# Patient Record
Sex: MALE | NOT HISPANIC OR LATINO | Employment: FULL TIME | URBAN - METROPOLITAN AREA
[De-identification: names, ages, dates, MRNs, and addresses within clinical notes are randomized per-mention and may not be internally consistent; named-entity substitution may affect disease eponyms.]

---

## 2024-01-24 ENCOUNTER — HOSPITAL ENCOUNTER (EMERGENCY)
Facility: HOSPITAL | Age: 29
Discharge: HOME | End: 2024-01-24
Payer: COMMERCIAL

## 2024-01-24 ENCOUNTER — APPOINTMENT (OUTPATIENT)
Dept: RADIOLOGY | Facility: HOSPITAL | Age: 29
End: 2024-01-24
Payer: COMMERCIAL

## 2024-01-24 VITALS
DIASTOLIC BLOOD PRESSURE: 78 MMHG | WEIGHT: 235 LBS | BODY MASS INDEX: 28.62 KG/M2 | HEIGHT: 76 IN | SYSTOLIC BLOOD PRESSURE: 115 MMHG | OXYGEN SATURATION: 99 % | TEMPERATURE: 98.7 F | RESPIRATION RATE: 16 BRPM | HEART RATE: 69 BPM

## 2024-01-24 DIAGNOSIS — R51.9 ACUTE NONINTRACTABLE HEADACHE, UNSPECIFIED HEADACHE TYPE: Primary | ICD-10-CM

## 2024-01-24 PROCEDURE — 99284 EMERGENCY DEPT VISIT MOD MDM: CPT

## 2024-01-24 PROCEDURE — 2500000001 HC RX 250 WO HCPCS SELF ADMINISTERED DRUGS (ALT 637 FOR MEDICARE OP)

## 2024-01-24 PROCEDURE — 70450 CT HEAD/BRAIN W/O DYE: CPT | Performed by: STUDENT IN AN ORGANIZED HEALTH CARE EDUCATION/TRAINING PROGRAM

## 2024-01-24 PROCEDURE — 70450 CT HEAD/BRAIN W/O DYE: CPT

## 2024-01-24 RX ORDER — IBUPROFEN 600 MG/1
600 TABLET ORAL ONCE
Status: COMPLETED | OUTPATIENT
Start: 2024-01-24 | End: 2024-01-24

## 2024-01-24 RX ADMIN — IBUPROFEN 600 MG: 600 TABLET, FILM COATED ORAL at 18:40

## 2024-01-24 ASSESSMENT — COLUMBIA-SUICIDE SEVERITY RATING SCALE - C-SSRS
2. HAVE YOU ACTUALLY HAD ANY THOUGHTS OF KILLING YOURSELF?: NO
6. HAVE YOU EVER DONE ANYTHING, STARTED TO DO ANYTHING, OR PREPARED TO DO ANYTHING TO END YOUR LIFE?: NO
1. IN THE PAST MONTH, HAVE YOU WISHED YOU WERE DEAD OR WISHED YOU COULD GO TO SLEEP AND NOT WAKE UP?: NO

## 2024-01-24 ASSESSMENT — PAIN - FUNCTIONAL ASSESSMENT
PAIN_FUNCTIONAL_ASSESSMENT: 0-10
PAIN_FUNCTIONAL_ASSESSMENT: 0-10

## 2024-01-24 ASSESSMENT — PAIN DESCRIPTION - LOCATION: LOCATION: HEAD

## 2024-01-24 ASSESSMENT — PAIN DESCRIPTION - PAIN TYPE: TYPE: ACUTE PAIN

## 2024-01-24 ASSESSMENT — PAIN SCALES - GENERAL
PAINLEVEL_OUTOF10: 0 - NO PAIN
PAINLEVEL_OUTOF10: 1

## 2024-01-24 NOTE — ED TRIAGE NOTES
Pt c/o swelling in his temples today . Pt then developed a slight ha. No otc meds. No blurred vision.

## 2024-01-24 NOTE — ED PROVIDER NOTES
HPI   Chief Complaint   Patient presents with    Headache     Pt states swelling in temples       HPI  HISTORY OF PRESENT ILLNESS:  28 y.o. male who presents to the ED with complaint of a headache.  Patient states that earlier today he saw himself on his phone camera and states it looked like his temples were swollen.  He states that he then developed a headache at the bilateral temples and into the occipital region.  Aching in quality.  Headache was not thunderclap in onset and is not the worst headache of his life.  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  He called his PCP, who recommended he come to the ED for evaluation.  He states that he now has a very mild 1/10 aching headache at the occipital region, and otherwise feels well.  He states that he feels like the swelling has resolved.  He denies neck pain or stiffness.  No fevers or chills. No syncope, no confusion. No nausea or vomiting. Denies weakness, numbness, or tingling of extremities.  No dizziness or lightheadedness.  No difficulty with gait, speech, or swallowing.  No blurry vision or vision loss. No drug or alcohol use. No use of anticoagulants or antiplatelets. No history of bleeding or clotting disorders.  He states that he does not normally get headaches, and has never had similar symptoms in the past.  He does have a history of HIV, and states that he is currently undetectable on his medications.  Would like a CT scan.  Denies chest pain or shortness of breath, no abdominal pain, no cough, no recent illness.  No other complaints or symptoms voiced.    PMH: HIV, HTN, PFO, Bipolar disorder, PTSD  Family history: noncontributory  Social history: non smoker, no ETOH, no illicit substances    REVIEW OF SYSTEMS:  General: Denies fever, chills, malaise, confusion, decreased appetite or fluids  Respiratory:  Denies cough, shortness of breath  Cardiac:  Denies chest pain,  palpitations  Gastrointestinal:  Denies abdominal pain, nausea, vomiting, diarrhea  Musculoskeletal:  Denies joint pain, neck pain, back pain, swelling, weakness  Neurological: +headache. Denies dizziness, confusion, headache, numbness, tingling, syncope  Eyes:  Denies blurry vision, vision loss or changes  ENMT:  Denies nosebleed, sore throat  Skin: Denies rash or wounds. Denies bruising or bleeding  Genitourinary:  Denies dysuria, flank pain, hematuria        No data recorded                Patient History   No past medical history on file.  No past surgical history on file.  No family history on file.  Social History     Tobacco Use    Smoking status: Not on file    Smokeless tobacco: Not on file   Substance Use Topics    Alcohol use: Not on file    Drug use: Not on file       Physical Exam   ED Triage Vitals [01/24/24 1804]   Temperature Heart Rate Respirations BP   37.1 °C (98.7 °F) 63 15 150/69      Pulse Ox Temp src Heart Rate Source Patient Position   98 % -- -- --      BP Location FiO2 (%)     -- --       Physical Exam  General: Vital signs stable. Alert & oriented.  No acute distress. Well nourished. Well hydrated. GCS=15  Neuro: Cranial nerves grossly intact. Normal gait. No motor or sensory changes. Appropriate and equal sensation and strength bilaterally.  No focal findings identified.  HENMT: No appreciable edema about the temples, face, or skull.  Nontender over the temples.  No hematoma, abrasion, or ecchymosis. No Raccoon eyes, No Holly sign. Facial bones and skull nontender, no step-offs or crepitus. Mucous membranes moist. Hearing grossly intact. No meningeal signs, moves neck freely. No C-spine tenderness, normal head and neck range of motion.  Eyes: PERRL, EOMs intact and nonpainful. No nystagmus. Visual fields intact bilaterally. Conjunctiva clear with no redness.  Cardiac: Sinus rhythm.  Pulmonary: No respiratory distress, normal respiratory effort. No accessory muscle use. CTAB.  Abdominal:  Soft and nontender.  Skin: Intact, warm, and dry. No lesions, rash, petechiae, or purpura.  MSK: full range of motion of upper and lower extremities. No tenderness over the midline cervical, thoracic, or lumbar spine. Extremities are warm and well perfused, no cyanosis, no edema.  Psych: Appropriate mood and affect.  ED Course & MDM   Diagnoses as of 01/25/24 0848   Acute nonintractable headache, unspecified headache type       Medical Decision Making  ED course / MDM     Summary:  Patient presented with a headache and feel like his temples were swollen.  Symptoms have improved, no longer feels like his head is swollen.  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  Vital signs are stable, mildly hypertensive in triage at 150/69, patient is very well-appearing, ambulates unassisted, no acute distress.  On exam, there are no neurologic deficits, normal strength and sensation of upper and lower extremities, no ataxia.  No visible edema over the face, temples, or head.  No meningeal signs.  Nontender over the temples, scalp, or face.  Patient does have a history of HIV, and does not get headaches often, would like a CT scan, reasonable given his risk factors.  He was given a dose of Motrin in the ED. CT scan unremarkable, no acute intracranial hemorrhage, mass effect, or CT.  No acute infarct.  Repeat vitals show BP improved to 115/78.  Patient feels reassured with a normal CT scan.  He had no neck pain or stiffness, no fevers or chills, headache was not thunderclap in onset, and is not the worst headache of his life, low suspicion for meningitis or SAH, patient agrees that LP is not indicated at this time.  He is eager for discharge.  Will follow-up with his PCP this week. Patient was given strict return precautions, understands reasons to return to the ED. Also discussed supportive care instructions. I expressed the  importance of outpatient follow up with their PCP. All questions were answered, patient expressed understanding and stated that they would comply.    Patient was advised to follow up with PCP or recommended provider in 2-3 days for another evaluation and exam. I advised patient and family/friend/caregiver/guardian to return or go to closest emergency room immediately if symptoms change, get worse, new symptoms develop prior to follow up. If there is no improvement in symptoms in the next 24 hours they are advised to return for further evaluation and exam. I also explained the plan and treatment course. Patient and family/friend/caregiver/guardian is in agreement with plan, treatment course, and follow up and states verbally that they will comply.    Impression:  1. See diagnosis    Plan: Homegoing. I discussed the differential, results, and discharge plan with the patient and family/friend/caregiver. I emphasized the importance of follow-up with the physician I referred them to in the timeframe recommended.  I explained reasons for the patient to return to the Emergency Department. They agreed that if they feel their condition is worsening or if they have any other concern they should call 911 immediately for further assistance. We also discussed medications that were prescribed including common side effects and interactions. The patient was advised to abstain from driving, operating heavy machinery, or making significant decisions while taking medications such as opiates and muscle relaxers that may impair this. I gave the patient an opportunity to ask all questions they had and answered all of them accordingly. They understand return precautions and discharge instructions. The patient and family/friend/caregiver expressed understanding verbally and that they would comply.       Disposition: Discharge    This note has been transcribed using voice recognition and may contain grammatical errors, misplaced words,  incorrect words, incorrect phrases or other errors.   Procedure  Procedures     Aubree Faye PA-C  01/25/24 0809

## 2024-01-24 NOTE — Clinical Note
Pete Mcconnell was seen and treated in our emergency department on 1/24/2024.  He may return to work on 01/25/2024.       If you have any questions or concerns, please don't hesitate to call.      Aubree Faye PA-C

## 2025-01-31 ENCOUNTER — OFFICE VISIT (OUTPATIENT)
Dept: URGENT CARE | Age: 30
End: 2025-01-31
Payer: COMMERCIAL

## 2025-01-31 VITALS
DIASTOLIC BLOOD PRESSURE: 66 MMHG | RESPIRATION RATE: 20 BRPM | TEMPERATURE: 99.2 F | HEIGHT: 76 IN | WEIGHT: 250 LBS | OXYGEN SATURATION: 94 % | BODY MASS INDEX: 30.44 KG/M2 | SYSTOLIC BLOOD PRESSURE: 119 MMHG | HEART RATE: 119 BPM

## 2025-01-31 DIAGNOSIS — J10.1 INFLUENZA A: Primary | ICD-10-CM

## 2025-01-31 DIAGNOSIS — R05.1 ACUTE COUGH: ICD-10-CM

## 2025-01-31 LAB
POC RAPID INFLUENZA A: POSITIVE
POC RAPID INFLUENZA B: NEGATIVE

## 2025-01-31 RX ORDER — PROPRANOLOL HYDROCHLORIDE 60 MG/1
60 CAPSULE, EXTENDED RELEASE ORAL
COMMUNITY
Start: 2024-09-18

## 2025-01-31 RX ORDER — OSELTAMIVIR PHOSPHATE 75 MG/1
75 CAPSULE ORAL EVERY 12 HOURS
Qty: 10 CAPSULE | Refills: 0 | Status: SHIPPED | OUTPATIENT
Start: 2025-01-31 | End: 2025-02-05

## 2025-01-31 RX ORDER — COVID-19 MOLECULAR TEST ASSAY
KIT MISCELLANEOUS
COMMUNITY
Start: 2023-03-01

## 2025-01-31 RX ORDER — ARIPIPRAZOLE 15 MG/1
1 TABLET ORAL
COMMUNITY
Start: 2024-08-19

## 2025-01-31 RX ORDER — VALACYCLOVIR HYDROCHLORIDE 1 G/1
1000 TABLET, FILM COATED ORAL 2 TIMES DAILY PRN
COMMUNITY
Start: 2025-01-12

## 2025-01-31 RX ORDER — PAROXETINE HYDROCHLORIDE 40 MG/1
40 TABLET, FILM COATED ORAL
COMMUNITY

## 2025-01-31 RX ORDER — IBUPROFEN 200 MG
TABLET ORAL
COMMUNITY
Start: 2024-05-09

## 2025-01-31 RX ORDER — BUPROPION HYDROCHLORIDE 100 MG/1
100 TABLET, EXTENDED RELEASE ORAL 2 TIMES DAILY
COMMUNITY

## 2025-01-31 RX ORDER — DOXYCYCLINE 100 MG/1
100 CAPSULE ORAL 2 TIMES DAILY
COMMUNITY
Start: 2025-01-08

## 2025-01-31 RX ORDER — BICTEGRAVIR SODIUM, EMTRICITABINE, AND TENOFOVIR ALAFENAMIDE FUMARATE 50; 200; 25 MG/1; MG/1; MG/1
1 TABLET ORAL
COMMUNITY
Start: 2024-09-03

## 2025-01-31 RX ORDER — TOPIRAMATE 50 MG/1
50 TABLET, FILM COATED ORAL NIGHTLY
COMMUNITY

## 2025-01-31 RX ORDER — OMEPRAZOLE 20 MG/1
20 CAPSULE, DELAYED RELEASE ORAL DAILY PRN
COMMUNITY
Start: 2025-01-08

## 2025-01-31 RX ORDER — MINOXIDIL 2.5 MG/1
2.5 TABLET ORAL
COMMUNITY
Start: 2023-07-14 | End: 2025-08-26

## 2025-01-31 RX ORDER — NALTREXONE HYDROCHLORIDE 50 MG/1
50 TABLET, FILM COATED ORAL
COMMUNITY
Start: 2024-09-18

## 2025-01-31 RX ORDER — CLONIDINE HYDROCHLORIDE 0.2 MG/1
1 TABLET ORAL DAILY
COMMUNITY

## 2025-01-31 RX ORDER — DOXEPIN HYDROCHLORIDE 100 MG/1
100-200 CAPSULE ORAL NIGHTLY
COMMUNITY
Start: 2024-06-20

## 2025-01-31 RX ORDER — BENZONATATE 200 MG/1
200 CAPSULE ORAL EVERY 8 HOURS PRN
Qty: 30 CAPSULE | Refills: 0 | Status: SHIPPED | OUTPATIENT
Start: 2025-01-31

## 2025-01-31 RX ORDER — ATORVASTATIN CALCIUM 10 MG/1
1 TABLET, FILM COATED ORAL NIGHTLY
COMMUNITY
Start: 2025-01-12

## 2025-01-31 RX ORDER — BUSPIRONE HYDROCHLORIDE 30 MG/1
TABLET ORAL EVERY 12 HOURS
COMMUNITY

## 2025-01-31 RX ORDER — ARIPIPRAZOLE 10 MG/1
1 TABLET ORAL
COMMUNITY
Start: 2024-02-06

## 2025-01-31 RX ORDER — SELENIUM SULFIDE 2.5 MG/100ML
LOTION TOPICAL
COMMUNITY
Start: 2024-08-19

## 2025-01-31 RX ORDER — ARIPIPRAZOLE 20 MG/1
20 TABLET ORAL DAILY
COMMUNITY

## 2025-01-31 ASSESSMENT — ENCOUNTER SYMPTOMS
WHEEZING: 0
SHORTNESS OF BREATH: 0
COUGH: 1
HEADACHES: 1
CHILLS: 1
VOMITING: 0
FEVER: 1
TROUBLE SWALLOWING: 0
SORE THROAT: 1

## 2025-01-31 NOTE — LETTER
January 31, 2025     Patient: Pete Mcconnell   YOB: 1995   Date of Visit: 1/31/2025       To Whom It May Concern:    Pete Mcconnell was seen in my clinic on 1/31/2025 at 10:20 am. Please excuse Pete for his absence from work on this day to make the appointment. Please excuse for 1/30/25 and 1/31/2025. May return on 0203/2025.    If you have any questions or concerns, please don't hesitate to call.         Sincerely,         Marita Yu PA-C        CC: No Recipients

## 2025-01-31 NOTE — PROGRESS NOTES
Subjective   Patient ID: Pete Mcconnell is a 29 y.o. male. They present today with a chief complaint of Headache and Fever.    HISTORY OF PRESENT ILLNESS:    Patient presents to the clinic for generalized headaches, malaise, fever/chills, sore throat, and cough. States he initially started feeling unwell Wednesday going into Thursday (1-2 days ago). Denies confirmed sick contacts. Denies hx of asthma or inhaler usage. Denies dyspnea, chest pain, or wheezing. COVID-19 negative at home.    Past Medical History  Allergies as of 01/31/2025 - Reviewed 01/31/2025   Allergen Reaction Noted    Varenicline Unknown 01/31/2025       Current Outpatient Medications   Medication Instructions    ARIPiprazole (Abilify) 10 mg tablet 1 tablet, Daily (0630)    ARIPiprazole (Abilify) 15 mg tablet 1 tablet, Daily (0630)    ARIPiprazole (ABILIFY) 20 mg, oral, Daily    atorvastatin (Lipitor) 10 mg tablet 1 tablet, Nightly    benzonatate (TESSALON) 200 mg, oral, Every 8 hours PRN, Do not crush or chew.    Biktarvy -25 mg tablet 1 tablet, Daily RT    buPROPion SR (WELLBUTRIN SR) 100 mg, oral, 2 times daily    busPIRone (Buspar) 30 mg tablet Every 12 hours    cariprazine (Vraylar) 3 mg capsule 1 capsule, Every 24 hours    cloNIDine (Catapres) 0.2 mg tablet 1 tablet, oral, Daily    COVID-19 antigen test (BinaxNOW COVID-19 Ag Self Test) kit FOLLOW INSTRUCTIONS INCLUDED WITH THE PACKAGE.    doxepin (SINEQUAN) 100-200 mg, Nightly    doxycycline (MONODOX) 100 mg, 2 times daily    minoxidil (LONITEN) 2.5 mg, Daily RT    naltrexone (DEPADE) 50 mg, Daily RT    nicotine (Nicoderm CQ) 14 mg/24 hr patch PLACE 1 PATCH ONTO THE SKIN ONCE DAILY (EVERY 24 HOURS)    omeprazole (PRILOSEC) 20 mg, Daily PRN    oseltamivir (TAMIFLU) 75 mg, oral, Every 12 hours    PARoxetine (PAXIL) 40 mg, oral, Daily before breakfast    propranolol LA (INDERAL LA) 60 mg, Daily RT    selenium sulfide (Selsun) 2.5 % shampoo Apply topically.    topiramate (TOPAMAX) 50 mg,  "oral, Nightly    valACYclovir (VALTREX) 1,000 mg, 2 times daily PRN         No past medical history on file.    No past surgical history on file.     reports that he has never smoked. He has never used smokeless tobacco.    Review of Systems    Review of Systems   Constitutional:  Positive for chills and fever.   HENT:  Positive for sore throat. Negative for drooling and trouble swallowing.    Respiratory:  Positive for cough. Negative for shortness of breath and wheezing.    Cardiovascular:  Negative for chest pain.   Gastrointestinal:  Negative for vomiting.   Skin:  Negative for rash.   Neurological:  Positive for headaches.                                 Objective    Vitals:    01/31/25 1008   BP: 119/66   Pulse: (!) 119   Resp: 20   Temp: 37.3 °C (99.2 °F)   TempSrc: Oral   SpO2: 94%   Weight: 113 kg (250 lb)   Height: 1.93 m (6' 4\")     No LMP for male patient.  PHYSICAL EXAMINATION:    Physical Exam  Vitals and nursing note reviewed.   Constitutional:       General: He is not in acute distress.     Appearance: Normal appearance. He is not ill-appearing.   HENT:      Head: Normocephalic and atraumatic.      Right Ear: Tympanic membrane, ear canal and external ear normal.      Left Ear: Tympanic membrane, ear canal and external ear normal.      Nose: Nose normal.      Mouth/Throat:      Mouth: Mucous membranes are moist.      Pharynx: Oropharynx is clear. No oropharyngeal exudate.   Eyes:      General:         Right eye: No discharge.         Left eye: No discharge.      Extraocular Movements: Extraocular movements intact.      Conjunctiva/sclera: Conjunctivae normal.   Cardiovascular:      Rate and Rhythm: Regular rhythm. Tachycardia present.      Heart sounds: Normal heart sounds.   Pulmonary:      Effort: Pulmonary effort is normal. No respiratory distress.      Breath sounds: Normal breath sounds. No stridor. No wheezing, rhonchi or rales.   Musculoskeletal:      Cervical back: Normal range of motion and " neck supple.   Lymphadenopathy:      Cervical: No cervical adenopathy.   Skin:     General: Skin is warm and dry.      Findings: No rash.   Neurological:      General: No focal deficit present.      Mental Status: He is alert and oriented to person, place, and time.   Psychiatric:         Mood and Affect: Mood normal.         Behavior: Behavior normal.        Procedures    Results for orders placed or performed in visit on 01/31/25   POCT Influenza A/B manually resulted   Result Value Ref Range    POC Rapid Influenza A Positive (A) Negative    POC Rapid Influenza B Negative Negative     Diagnostic study results (if any) were reviewed by Marita Yu PA-C.    Assessment/Plan   Allergies, medications, history, and pertinent labs/EKGs/Imaging reviewed by Marita Yu PA-C.     Orders and Diagnoses  Diagnoses and all orders for this visit:  Influenza A  -     oseltamivir (Tamiflu) 75 mg capsule; Take 1 capsule (75 mg) by mouth every 12 hours for 5 days.  -     benzonatate (Tessalon) 200 mg capsule; Take 1 capsule (200 mg) by mouth every 8 hours if needed for cough (Dry cough). Do not crush or chew.  Acute cough  -     POCT Influenza A/B manually resulted  -     benzonatate (Tessalon) 200 mg capsule; Take 1 capsule (200 mg) by mouth every 8 hours if needed for cough (Dry cough). Do not crush or chew.      Medical Admin Record    Given overall well appearance, vital signs, history, and exam as above, there is no indication for further emergent testing/intervention at this time.      I discussed with the patient my clinical thoughts at this time given the above and we had a shared decision-making conversation in a patient-centered decision-making model on how to proceed forward. Pt was instructed on the importance of close follow-up. They were told that an urgent care diagnosis is often a preliminary impression and that definitive care is often not able to be given in the urgent care setting. Pt was educated that  close follow-up is essential for good health and good outcomes. Patient was provided with the following instructions:         May take Tamiflu as directed.       May take benzonatate for symptomatic relief of dry cough as needed. Add guaifenesin for mucus clearance if needed.     Cool mist humidifier in room at night while sleeping. Sleep in semi elevated position.    Tea with honey, throat lozenges, vapor rub, voice rest.     Tylenol or ibuprofen for fevers/pain as needed.      Plenty of fluids and rest.      Good hand hygiene.      Follow up with PCP or RTC in the next 7 days if sx fail to show adequate improvement.        Clinical impression as well as limitations of available testing/examination, all discussed with patient. Pt is well at this time in the urgent care. They are comfortable with the present assessment and plan to be discharged home. Discussed with them close outpatient follow up, reassessment, and possible further testing/treatment via their PCP/specialist if symptoms fail to improve; they agree, understand, and are comfortable with this plan. Pt given the opportunity to ask questions prior to discharge and all questions were answered at this time. Via our discussion, the patient was advised of warning signs and instructions were reviewed. Strict ED precautions were given, acknowledged, and understood. Discussed with the patient/family that it is okay to return to the urgent care at any time for anything. Advised to present to the ED if present condition changes/worsens or if they develop new symptoms at any time after discharge. Also, advised to go to the ED if they cannot establish outpatient follow-up in time frame specified above. Pt verbalized understanding and agreement with plan and instructions. Discussed the need for close follow up with their primary care provider and/or specialist for further testing/treatment/care/consultation in the short time frame as noted above, if needed - they  understand these instructions and agree to close follow up for these reasons. Patient discharged home in stable condition.      Follow Up Instructions  No follow-ups on file.    Patient disposition: Home    Electronically signed by Marita Yu PA-C  10:29 AM